# Patient Record
(demographics unavailable — no encounter records)

---

## 2025-07-22 NOTE — HISTORY OF PRESENT ILLNESS
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 She had a hard time coming off the atenolol and adding losartan 25mg but feels better. Now able to go back to swimming and feels much improved. Upset about ECHO and no Zio results yet.

## 2025-07-22 NOTE — DISCUSSION/SUMMARY
[FreeTextEntry1] : The patient is a 91-year-old female HTN, HLD, PVC with progressive conduction disease and PVC with severe MR, mod sev TR.. #1 CV- ECHO severe MR, mod to severe TR, agreed to have evaluation by Dr. Atwood #2 PVC- event monitor- Zio AT 2 weeks, results pending #2 HTN- c/w losartan 25mg and c/w Dyazide #3 HLD- c/w atorvastatin 40mg  #4 General- good family support, instructed to call office with any symptoms or questions  [EKG obtained to assist in diagnosis and management of assessed problem(s)] : EKG obtained to assist in diagnosis and management of assessed problem(s)